# Patient Record
Sex: FEMALE | Race: WHITE | NOT HISPANIC OR LATINO | URBAN - NONMETROPOLITAN AREA
[De-identification: names, ages, dates, MRNs, and addresses within clinical notes are randomized per-mention and may not be internally consistent; named-entity substitution may affect disease eponyms.]

---

## 2018-03-14 ENCOUNTER — OFFICE VISIT CONVERTED (OUTPATIENT)
Dept: FAMILY MEDICINE CLINIC | Age: 63
End: 2018-03-14
Attending: FAMILY MEDICINE

## 2018-10-03 ENCOUNTER — OFFICE VISIT CONVERTED (OUTPATIENT)
Dept: FAMILY MEDICINE CLINIC | Age: 63
End: 2018-10-03
Attending: FAMILY MEDICINE

## 2021-05-18 NOTE — PROGRESS NOTES
"Angela Williamson 1955     Office/Outpatient Visit    Visit Date: Wed, Oct 3, 2018 10:58 am    Provider: Ronna Blanchard MD (Assistant: Nazanin Macias MA)    Location: [facility]        Electronically signed by Ronna Blanchard MD on  10/04/2018 05:30:16 PM                             SUBJECTIVE:        CC: Ms. Jaqueline Williamson is a 62 yo WF here for medication refill, she also endorses sinus pressure with ear pain and a mole she is concerned about. ( PATIENT IS NO LONGER TAKING BUSPIRONE/DICLOFENAC )         HPI:     Sinus pressure with ear pain- For over a month, she started having bilateral ear pain, sinus pressure in the maxillary/frontal region that has progressed to the back of her neck.  She endorses sneezing, nasal congestion, and occasional dizziness. She denies cough or runny nose.  She has been taking OTC generic benadryl QD and says it \"helps a little bit.\"         Mole- She has a mole in the middle of her back that \"has been itchy for months.\" It is a light brown macule.  She does not think she has always had it but she knows it has been there for >6 months.      Anxiety with depression- No longer taking Buspirone because it increased her blood sugars.  She says her anxiety and depression are situational with \"what is going on at home.\"  Denies tense muscles or racing thoughts.  Denies feeling down, she reads a lot, denies anhedonia, been sleeping well this week. Denies SI. No suicidal thoughts, she states she is coping well, biggest stressor is her sick  and caring for him     High Cholesterol- She is in today to get her cholesterol levels checked. She has been taking OTC Cq10 QD. She is supposed to take cholesterol medication but it exacerbates your arthritis.     Essential HTN- taking Lisonipril/HCTZ 20mg/12.5 mg QD. She thinks these medications are going okay.  Denies blurry vision, denies CP, denies heart palpitations.  She rarely checks her blood pressure at home, she says it " "is \"okay.\" She needs a refill on this medication.     Generalized Osteoarthritis- she was taking Diclofenac but she can no longer afford the medication.  It helped with the pain but it also made her blood sugars increase. She does not want to take this medication.     ROS:     CONSTITUTIONAL:  Negative for chills and fever.      EYES:  Negative for blurred vision.      E/N/T:  Positive for ear pain, nasal congestion, sinus pressure and sore throat ( intermittent ).   Negative for frequent rhinorrhea.      CARDIOVASCULAR:  Positive for dizziness ( related to ear pain ).   Negative for chest pain, palpitations, pedal edema or tachycardia.      RESPIRATORY:  Negative for recent cough, dyspnea and frequent wheezing.      GASTROINTESTINAL:  Positive for nausea.   Negative for abdominal pain, constipation, diarrhea or vomiting.      MUSCULOSKELETAL:  Positive for arthralgias and back pain ( acute ).      INTEGUMENTARY/BREAST:  Positive for suspicous mole ( itchy ).   Negative for rash.      NEUROLOGICAL:  Positive for dizziness, headaches and weakness.      PSYCHIATRIC:  Positive for Flat affect.   Negative for anxiety, depression, anhedonia, sadness or suicidal thoughts.          PMH/FMH/SH:     Last Reviewed on 10/03/2018 12:08 PM by Ronna Blanchard    Past Medical History:             PAST MEDICAL HISTORY         Hyperlipidemia    Hypertension     ANXIETY AND DEPRESSION    OA         GYNECOLOGICAL HISTORY:     miscarriage 2     anxiety and depression    osteoarthritis         CURRENT MEDICAL PROVIDERS:    NO OTHER PROVIDERS             PAST MEDICAL HISTORY                 ADVANCE DIRECTIVES: Living will on file, signed 3-, ./pr         Surgical History:         Cholecystectomy    Dilation and Curettage    Hysterectomy: DUB/ovaries removed  later;     Tonsillectomy; at age 6    Tubal Ligation      Laminectomy     Procedures:    Colonoscopy ( 3-5-2013 normal )    Laparoscopy ( ovarian cyst )         " Family History:     Father:  at age 59; Cause of death was MI     Mother:  at age 64; Cause of death was lung cancer     Brother(s): 4 brother(s) total; 2 ;  Brain Tumor; oral cancer     Sister(s): 5 sister(s) total;  Coronary Artery Disease     Son(s): Healthy; 1 son(s) total     Daughter(s): Healthy; 1 daughter(s) total         Social History:     Occupation: Disabled (due to low back pain)     Marital Status:      Children: 2 children         Tobacco/Alcohol/Supplements:     Last Reviewed on 10/03/2018 12:08 PM by Ronna Blanchard    Tobacco: Current Smoker: She currently smokes every day, 1/2 to 1 pack per day.  Non-drinker         Substance Abuse History:     Last Reviewed on 2014 02:38 PM by Vianca Be             Allergies:     Last Reviewed on 10/03/2018 11:02 AM by Nazanin Macias    Augmentin: vomiting (Adverse Reaction)    Duloxetine HCl:    Codeine Phosphate:        Current Medications:     Last Reviewed on 10/03/2018 11:02 AM by Nazanin Macias    Lisinopril/Hydrochlorothiazide 20mg/12.5mg Tablet 1 tab daily     Buspirone HCl 15mg Tablet 1/2 po bid     Diclofenac Sodium 75mg Tablets, Enteric Coated 1 tab bid with food     Ventolin HFA 90mcg/1actuation Oral Inhaler Inhale 2 puff(s) by mouth q 4 to 6 hr         OBJECTIVE:        Vitals:         Current: 10/3/2018 11:04:12 AM    Ht:  5 ft, 0 in;  Wt: 166.8 lbs;  BMI: 32.6    T: 98.2 F (oral);  BP: 113/70 mm Hg (left arm, sitting);  P: 105 bpm (left arm (BP Cuff), sitting);  sCr: 0.84 mg/dL;  GFR: 68.92        Exams:     PHYSICAL EXAM:     GENERAL: vital signs recorded - well developed, well nourished;  no apparent distress;     EYES: extraocular movements intact; conjunctiva and cornea are normal; PERRLA;     E/N/T: EARS: left TM is normal and the right TM is distorted, dull, has fluid behind it, retracted, and scarred;  NOSE: bilateral maxillary sinus tenderness present; OROPHARYNX:  normal mucosa, dentition,  gingiva, and posterior pharynx;     NECK: range of motion is normal; thyroid is non-palpable;     RESPIRATORY: normal respiratory rate and pattern with no distress; normal breath sounds with no rales, rhonchi, wheezes or rubs;     CARDIOVASCULAR: normal rate; rhythm is regular;  no systolic murmur; no edema;     MUSCULOSKELETAL: normal gait;     NEUROLOGICAL:  cranial nerves, motor and sensory function, reflexes, gait and coordination are all intact;     PSYCHIATRIC: affect/demeanor: flat;  normal psychomotor function; speech pattern: flat;  thought/perception: admits to fleeting suicidal thoughts, not active thoughts or a threat to herself today;         ASSESSMENT           300.4   F41.1   F33.9  Anxiety with depression              DDx:     272.0   E78.00  Essential hypercholesterolemia              DDx:     401.1   I10  Essential hypertension              DDx:     715.00   M15.9  Generalized osteoarthritis, site unspecified              DDx:     477.0   J30.1  Allergies              DDx:     473.9   J32.9  Chronic sinusitis, unspecified              DDx:         ORDERS:         Meds Prescribed:       Refill of: Lisinopril/Hydrochlorothiazide 20mg/12.5mg Tablet 1 tab daily  #90 (Ninety) tablet(s) Refills: 1       Refill of: Ventolin HFA (Albuterol) 90mcg/1actuation Oral Inhaler Inhale 2 puff(s) by mouth q 4 to 6 hr  #1 (One) inhaler(s) Refills: 5       Loratadine 10mg Tablet 1 tab daily  #90 (Ninety) tablet(s) Refills: 0       Fluticasone Propionate 50mcg/1actuation Nasal Spray 1 spray each nostil daily  #16 (Sixteen) gm Refills: 0         Radiology/Test Orders:       [id number]  Radiologic examination, sinuses, paranasal, complete, minimum of three views  (Send-Out)           Lab Orders:       [id number]  [facility] - [facility] CMP AND LIPID: [id number]  (Send-Out)                   PLAN:          Anxiety with depression stable and well controlled without meds at this time          Essential hypercholesterolemia     LABORATORY:   Labs ordered to be performed today include HTN/Lipid Panel: CMP, Lipid.            Orders:       [id number]  HTN - [facility] CMP AND LIPID: [id number]  (Send-Out)            Essential hypertension well controlled          Generalized osteoarthritis, site unspecified defers NSAIDs at this time, ok for OTC NSAIDs prn          Allergies           Prescriptions:       Refill of: Lisinopril/Hydrochlorothiazide 20mg/12.5mg Tablet 1 tab daily  #90 (Ninety) tablet(s) Refills: 1       Refill of: Ventolin HFA (Albuterol) 90mcg/1actuation Oral Inhaler Inhale 2 puff(s) by mouth q 4 to 6 hr  #1 (One) inhaler(s) Refills: 5       Loratadine 10mg Tablet 1 tab daily  #90 (Ninety) tablet(s) Refills: 0       Fluticasone Propionate 50mcg/1actuation Nasal Spray 1 spray each nostil daily  #16 (Sixteen) gm Refills: 0          Chronic sinusitis, unspecified         RADIOLOGY:  I have ordered Sinus series xray to be done today.            Orders:       [id number]  Radiologic examination, sinuses, paranasal, complete, minimum of three views  (Send-Out)               Patient Recommendations:        For  Chronic sinusitis, unspecified:     I also recommend Sinus series xray.              CHARGE CAPTURE           **Please note: ICD descriptions below are intended for billing purposes only and may not represent clinical diagnoses**        Primary Diagnosis:         300.4 Anxiety with depression            F41.1    Generalized anxiety disorder           F33.9    Major depressive disorder, recurrent, unspecified              Orders:          [id number]   Office/outpatient visit; established patient, level 4  (In-House)           272.0 Essential hypercholesterolemia            E78.00    Pure hypercholesterolemia, unspecified    401.1 Essential hypertension            I10    Essential (primary) hypertension    715.00 Generalized osteoarthritis, site unspecified            M15.9    Polyosteoarthritis, unspecified    477.0 Allergies            J30.1    Allergic  rhinitis due to pollen    473.9 Chronic sinusitis, unspecified            J32.9    Chronic sinusitis, unspecified

## 2021-05-18 NOTE — PROGRESS NOTES
Angela Williamson 1955     Office/Outpatient Visit    Visit Date: Wed, Mar 14, 2018 08:49 am    Provider: Ronna Blanchard MD (Assistant: Carolina Be MA)    Location: [facility]        Electronically signed by Ronna Blanchard MD on  03/15/2018 10:28:39 AM                             SUBJECTIVE:        CC: BP follow up, sinus pain         HPI:         Jaqueline presents with essential hypertension.  Current nonpharmacologic treatment includes low sodium diet.  Her current cardiac medication regimen includes a diuretic and an ACE inhibitor.  She did not bring her blood pressure diary, but says that pressures have been okay.  She is tolerating the medication well without side effects.  Compliance with treatment has been good.  more HA's on a daily basis with sinus pain     Jaqueline is frustrated, her  is fussing at her every day b/c he is on steroids and it is making him ramos and she is having trouble coping with him and his muscular disorder and diabetes.  She was on zoloft and buspar-she ran out of her meds a couple months and she called last month and was given 10 ativan for severe anxiety and it helped but she does not want to be on anything addictive.  She is depressed with sadness, poor sleep, feels frustrated, with anhydonia.   She has  h/o alcohol abuse in past-quit over 27 years ago, no active use.  She denies crying spells and no suicidal thoughts.         Jaqueline has chronic OA, told she had RA years ago, 2014 labs were neg for RA, pain is in her hips back and L middle finger and knees B. Her pain is well controlled with prn diclofenac         Additionally, she presents with history of essential hypercholesterolemia.  current treatment includes Crestor, SHE STOPPED THE fenofibrat/simvastatin/lipitor due to concerns about the labels and SE's, and fish oil BOLIVAR RED OTC.  Compliance with treatment has been good; she takes her medication as directed.  She denies experiencing any  hypercholesterolemia related symptoms.  Most recent lab tests include Total Cholesterol:  189 (mg/dL) (2017), HDL:  42 (mg/dL) (2017), Triglycerides:  234 (mg/dL) (2017), LDL:  100 (mg/dL) (2017), Creatinine, Serum:  0.64 (mg/dl) (2017), Glom Filt Rate, Est:  >60 (ml/min/1.73m2) (2017), Alkaline Phosphatase, Serum:  76 (U/L) (2017), ALT (SGPT):  24 (U/L) (2017), AST (SGOT):  17 (U/L) (2017).          Acute maxillary sinusitis details; this has been a problem for the past 2 months.  This is an acute problem without chronic or recurrent episodes.  Her primary symptoms include bilateral ear congestion and stuffiness,  maxillary facial pressure, headache, nasal congestion, post-nasal drip, rhinorrhea and tooth pain.  She denies chest congestion, cough or fever.  She has already tried generic zyrtec.  Medical history is pertinent for allergies.      ROS:     CONSTITUTIONAL:  Negative for chills and fever.      EYES:  Negative for blurred vision.      E/N/T:  Positive for nasal congestion.   Negative for ear pain, frequent rhinorrhea or sore throat.      CARDIOVASCULAR:  Negative for chest pain, palpitations, pedal edema and tachycardia.      RESPIRATORY:  Negative for recent cough, dyspnea and frequent wheezing.      GASTROINTESTINAL:  Positive for nausea.   Negative for abdominal pain, constipation, diarrhea or vomiting.      MUSCULOSKELETAL:  Positive for back pain ( acute ).      INTEGUMENTARY/BREAST:  Negative for rash.      NEUROLOGICAL:  Negative for dizziness, headaches and weakness.      PSYCHIATRIC:  Positive for crying spells, feelings of stress and sadness.          PMH/FMH/SH:     Last Reviewed on 3/14/2018 09:19 AM by Ronna Blanchard    Past Medical History:             PAST MEDICAL HISTORY         Hyperlipidemia    Hypertension         GYNECOLOGICAL HISTORY:     miscarriage 2     anxiety and depression    osteoarthritis         CURRENT MEDICAL  PROVIDERS:    NO OTHER PROVIDERS             PAST MEDICAL HISTORY                 ADVANCE DIRECTIVES: Living will on file, signed 3-, ./pr         Surgical History:         Cholecystectomy    Dilation and Curettage    Hysterectomy: DUB/ovaries removed  later;     Tonsillectomy; at age 6    Tubal Ligation      Laminectomy     Procedures:    Colonoscopy ( 3-5-2013 normal )    Laparoscopy ( ovarian cyst )         Family History:     Father:  at age 59; Cause of death was MI     Mother:  at age 64; Cause of death was lung cancer     Brother(s): 4 brother(s) total; 2 ;  Brain Tumor; oral cancer     Sister(s): 5 sister(s) total;  Coronary Artery Disease     Son(s): Healthy; 1 son(s) total     Daughter(s): Healthy; 1 daughter(s) total         Social History:     Occupation: Disabled (due to low back pain)     Marital Status:      Children: 2 children         Tobacco/Alcohol/Supplements:     Last Reviewed on 3/14/2018 09:19 AM by Ronna Blanchard    Tobacco: Current Smoker: She currently smokes every day, 1/2 to 1 pack per day.  Non-drinker         Substance Abuse History:     Last Reviewed on 2014 02:38 PM by Vianca Be    None             Allergies:     Last Reviewed on 3/14/2018 08:49 AM by Carolina Be    Augmentin: vomiting (Adverse Reaction)    Duloxetine HCl:    Codeine Phosphate:        Current Medications:     Last Reviewed on 3/14/2018 08:49 AM by Carolina Be    Lisinopril/Hydrochlorothiazide 10mg/12.5mg Tablet one a day     Buspirone HCl 15mg Tablet 1/2 po bid     Diclofenac Sodium 75mg Tablets, Enteric Coated 1 tab bid with food     Ventolin HFA 90mcg/1actuation Oral Inhaler Inhale 2 puff(s) by mouth q 4 to 6 hr         OBJECTIVE:        Vitals:         Current: 3/14/2018 8:53:46 AM    Ht:  5 ft, 0 in;  Wt: 172.4 lbs;  BMI: 33.7    T: 97.2 F (oral);  BP: 139/78 mm Hg (left arm, sitting);  P: 97 bpm (left arm (BP Cuff), sitting);  sCr: 0.64 mg/dL;  GFR:  92.88        Exams:     PHYSICAL EXAM:     GENERAL: vital signs recorded - well developed, well nourished;  no apparent distress;     EYES: extraocular movements intact; conjunctiva and cornea are normal; PERRLA;     E/N/T: OROPHARYNX:  normal mucosa, dentition, gingiva, and posterior pharynx;     NECK: range of motion is normal; thyroid is non-palpable;     RESPIRATORY: normal respiratory rate and pattern with no distress; normal breath sounds with no rales, rhonchi, wheezes or rubs;     CARDIOVASCULAR: normal rate; rhythm is regular;  no systolic murmur; no edema;     NEUROLOGICAL:  cranial nerves, motor and sensory function, reflexes, gait and coordination are all intact;     PSYCHIATRIC: affect/demeanor: flat;  normal psychomotor function; speech pattern: flat;  thought/perception: admits to fleeting suicidal thoughts, not active thoughts or a threat to herself today;         ASSESSMENT           401.1   I10  Essential hypertension              DDx:     300.4   F41.1   F33.9  Anxiety with depression              DDx:     715.00   M15.9  Generalized osteoarthritis, site unspecified              DDx:     272.0   E78.00  Essential hypercholesterolemia              DDx:     461.0   J01.00  Acute maxillary sinusitis              DDx:         ORDERS:         Meds Prescribed:       Azithromycin 250mg Tablet 2 po today, 1 po x 4 days  #6 (Six) tablet(s) Refills: 0       Refill of: Lisinopril/Hydrochlorothiazide (Lisinopril/Hydrochlorothiazide)  20mg/12.5mg Tablet 1 tab daily  #90 (Ninety) tablet(s) Refills: 1       Fluticasone Propionate 50mcg/1actuation Nasal Spray 1 spray each nostil daily  #16 (Sixteen) gm Refills: 2         Lab Orders:       [id number]  HTN - [facility] CMP AND LIPID: [id number]  (Send-Out)                   PLAN:          Essential hypertension stable on meds, due for labs     LABORATORY:  Labs ordered to be performed today include HTN/Lipid Panel: CMP, Lipid.            Prescriptions:       Refill of:  Lisinopril/Hydrochlorothiazide (Lisinopril/Hydrochlorothiazide)  20mg/12.5mg Tablet 1 tab daily  #90 (Ninety) tablet(s) Refills: 1           Orders:       [id number]  [facility] - [facility] CMP AND LIPID: [id number]  (Send-Out)             Patient Education Handouts:       High Blood Pressure (HTN)           Anxiety with depression better and stable on buspar, she was unable to quit smoking and never went to counseling for her marriage.  No suicidal ideation          Generalized osteoarthritis, site unspecified cont NSAIDs only OTC now (naproxyn)         RECOMMENDATIONS given include: try NSAID, patient  is told to  watch for upset stomach, PUD/GI bleeding and aware of increased risk of CAD.           Essential hypercholesterolemia stable on meds, due for labs          Acute maxillary sinusitis         RECOMMENDATIONS given include: Push Fluids, Rest, Follow up if no improvement or worsening symptoms like high fevers, vomiting, weakness, or increasing shortness of air.    .            Prescriptions:       Azithromycin 250mg Tablet 2 po today, 1 po x 4 days  #6 (Six) tablet(s) Refills: 0       Fluticasone Propionate 50mcg/1actuation Nasal Spray 1 spray each nostil daily  #16 (Sixteen) gm Refills: 2             CHARGE CAPTURE           **Please note: ICD descriptions below are intended for billing purposes only and may not represent clinical diagnoses**        Primary Diagnosis:         401.1 Essential hypertension            I10    Essential (primary) hypertension              Orders:          [id number]   Office/outpatient visit; established patient, level 4  (In-House)           300.4 Anxiety with depression            F41.1    Generalized anxiety disorder           F33.9    Major depressive disorder, recurrent, unspecified    715.00 Generalized osteoarthritis, site unspecified            M15.9    Polyosteoarthritis, unspecified    272.0 Essential hypercholesterolemia            E78.00    Pure hypercholesterolemia, unspecified     461.0 Acute maxillary sinusitis            J01.00    Acute maxillary sinusitis, unspecified

## 2021-07-01 VITALS
HEART RATE: 97 BPM | HEIGHT: 60 IN | DIASTOLIC BLOOD PRESSURE: 78 MMHG | TEMPERATURE: 97.2 F | BODY MASS INDEX: 33.85 KG/M2 | SYSTOLIC BLOOD PRESSURE: 139 MMHG | WEIGHT: 172.4 LBS

## 2021-07-01 VITALS
BODY MASS INDEX: 32.75 KG/M2 | WEIGHT: 166.8 LBS | HEART RATE: 105 BPM | DIASTOLIC BLOOD PRESSURE: 70 MMHG | TEMPERATURE: 98.2 F | SYSTOLIC BLOOD PRESSURE: 113 MMHG | HEIGHT: 60 IN